# Patient Record
(demographics unavailable — no encounter records)

---

## 2025-02-12 NOTE — HISTORY OF PRESENT ILLNESS
[FreeTextEntry1] : 29yo female presents with secondary amenorrhea. Pt with positive pregnancy test at home. Patient has noticed vaginal bleeding and added on today for concern for miscarriage LMP: 1/1/25 CHRISTIAN: 10/8/25 GA today: 5w6d Ucg: positive  Office US: No GS seen, uterus is anteverted and measures 5.96 x 4.24 x 4.71cm with endometrium measuring 0.72cm, R ovary measures 1.61 x 1.54 x 1.32cm and L ovary measures 1.36 x 2.08 x 1.43cm. No evidence of ectopic seen in the adnexa, no free fluid seen   Plan: - Explained PUL - early pregnancy, miscarriage, or ectopic/heterotopic - T&S, bhcg sent today with instructions to repeat in 48hrs.  - All questions answered - ectopic precautions reviewed 40min spent excluding jessa Kay MD

## 2025-02-19 NOTE — HISTORY OF PRESENT ILLNESS
[FreeTextEntry1] : HPI: Patient is a 29yo female who presents to follow up on PUL and notes vaginal bleeding. 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 R ovary appears normal measuring 1.76 x 1.98 x 1.73cm,   L ovary appears normal measuring 2.09 x 2.19 x 1.49cm. No free fluid seen.  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 Discussed genetic testing Discussed hormonal workup including TSH and A1c Ordered antiphospholipid workup: anticardiolipin antibody (IgG and IgM), lupus anticoagulant, anti-beta 2 glycoprotein I antibodies. Pt aware this test needs to be repeated at least 12 wks apart Question regarding less useful tests included AMH level, endometrial biopsy / hysteroscopy, hypercoagulable state and progesterone levels. Reviewed that most common cause of early miscarriage is chromosomal and IVF with PGD is a solution Resources of IVF and ideas about insurance options discussed 40min spent excluding jessa aKy MD

## 2025-02-19 NOTE — HISTORY OF PRESENT ILLNESS
[FreeTextEntry1] : HPI: Patient is a 29yo female who presents to follow up on PUL and notes vaginal bleeding. 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 R ovary appears normal measuring 1.76 x 1.98 x 1.73cm,   L ovary appears normal measuring 2.09 x 2.19 x 1.49cm. No free fluid seen.  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 Discussed genetic testing Discussed hormonal workup including TSH and A1c Ordered antiphospholipid workup: anticardiolipin antibody (IgG and IgM), lupus anticoagulant, anti-beta 2 glycoprotein I antibodies. Pt aware this test needs to be repeated at least 12 wks apart Question regarding less useful tests included AMH level, endometrial biopsy / hysteroscopy, hypercoagulable state and progesterone levels. Reviewed that most common cause of early miscarriage is chromosomal and IVF with PGD is a solution Resources of IVF and ideas about insurance options discussed 40min spent excluding jessa Kay MD